# Patient Record
Sex: FEMALE | Race: WHITE | NOT HISPANIC OR LATINO
[De-identification: names, ages, dates, MRNs, and addresses within clinical notes are randomized per-mention and may not be internally consistent; named-entity substitution may affect disease eponyms.]

---

## 2017-01-09 ENCOUNTER — APPOINTMENT (OUTPATIENT)
Dept: CARDIOLOGY | Facility: CLINIC | Age: 82
End: 2017-01-09

## 2017-01-16 ENCOUNTER — APPOINTMENT (OUTPATIENT)
Dept: OTOLARYNGOLOGY | Facility: CLINIC | Age: 82
End: 2017-01-16

## 2017-01-16 VITALS
BODY MASS INDEX: 29.27 KG/M2 | SYSTOLIC BLOOD PRESSURE: 143 MMHG | HEART RATE: 74 BPM | HEIGHT: 61 IN | DIASTOLIC BLOOD PRESSURE: 79 MMHG | WEIGHT: 155 LBS

## 2017-01-16 DIAGNOSIS — H61.23 IMPACTED CERUMEN, BILATERAL: ICD-10-CM

## 2017-01-16 DIAGNOSIS — R42 DIZZINESS AND GIDDINESS: ICD-10-CM

## 2017-01-16 DIAGNOSIS — Z88.9 ALLERGY STATUS TO UNSPECIFIED DRUGS, MEDICAMENTS AND BIOLOGICAL SUBSTANCES: ICD-10-CM

## 2017-01-16 DIAGNOSIS — H90.3 SENSORINEURAL HEARING LOSS, BILATERAL: ICD-10-CM

## 2017-01-23 ENCOUNTER — APPOINTMENT (OUTPATIENT)
Dept: CARDIOLOGY | Facility: CLINIC | Age: 82
End: 2017-01-23

## 2017-01-23 ENCOUNTER — NON-APPOINTMENT (OUTPATIENT)
Age: 82
End: 2017-01-23

## 2017-01-23 VITALS
SYSTOLIC BLOOD PRESSURE: 84 MMHG | BODY MASS INDEX: 30.21 KG/M2 | DIASTOLIC BLOOD PRESSURE: 58 MMHG | WEIGHT: 160 LBS | HEART RATE: 80 BPM | RESPIRATION RATE: 14 BRPM | HEIGHT: 61 IN

## 2017-01-23 DIAGNOSIS — I49.3 VENTRICULAR PREMATURE DEPOLARIZATION: ICD-10-CM

## 2017-01-23 DIAGNOSIS — R00.2 PALPITATIONS: ICD-10-CM

## 2017-01-23 RX ORDER — AMOXICILLIN 500 MG/1
500 CAPSULE ORAL
Qty: 21 | Refills: 0 | Status: DISCONTINUED | COMMUNITY
Start: 2016-08-22

## 2017-01-23 RX ORDER — ZOLPIDEM TARTRATE 5 MG/1
5 TABLET ORAL
Qty: 30 | Refills: 0 | Status: ACTIVE | COMMUNITY
Start: 2016-12-21

## 2017-02-08 ENCOUNTER — APPOINTMENT (OUTPATIENT)
Dept: OTOLARYNGOLOGY | Facility: CLINIC | Age: 82
End: 2017-02-08

## 2017-02-10 ENCOUNTER — RESULT REVIEW (OUTPATIENT)
Age: 82
End: 2017-02-10

## 2017-08-31 ENCOUNTER — APPOINTMENT (OUTPATIENT)
Dept: UROGYNECOLOGY | Facility: CLINIC | Age: 82
End: 2017-08-31
Payer: MEDICARE

## 2017-08-31 VITALS
HEIGHT: 61 IN | WEIGHT: 155 LBS | BODY MASS INDEX: 29.27 KG/M2 | DIASTOLIC BLOOD PRESSURE: 72 MMHG | HEART RATE: 80 BPM | SYSTOLIC BLOOD PRESSURE: 110 MMHG

## 2017-08-31 PROCEDURE — 51725 SIMPLE CYSTOMETROGRAM: CPT

## 2017-08-31 PROCEDURE — 51741 ELECTRO-UROFLOWMETRY FIRST: CPT

## 2017-08-31 PROCEDURE — 99204 OFFICE O/P NEW MOD 45 MIN: CPT | Mod: 25

## 2017-08-31 PROCEDURE — 81003 URINALYSIS AUTO W/O SCOPE: CPT | Mod: QW

## 2017-08-31 RX ORDER — HYDROCODONE BITARTRATE AND HOMATROPINE METHYLBROMIDE 5; 1.5 MG/5ML; MG/5ML
5-1.5 SYRUP ORAL
Qty: 100 | Refills: 0 | Status: DISCONTINUED | COMMUNITY
Start: 2017-01-13 | End: 2017-08-31

## 2017-09-01 ENCOUNTER — APPOINTMENT (OUTPATIENT)
Dept: CARDIOLOGY | Facility: CLINIC | Age: 82
End: 2017-09-01
Payer: MEDICARE

## 2017-09-01 ENCOUNTER — RESULT REVIEW (OUTPATIENT)
Age: 82
End: 2017-09-01

## 2017-09-01 ENCOUNTER — NON-APPOINTMENT (OUTPATIENT)
Age: 82
End: 2017-09-01

## 2017-09-01 VITALS
WEIGHT: 161 LBS | DIASTOLIC BLOOD PRESSURE: 58 MMHG | SYSTOLIC BLOOD PRESSURE: 116 MMHG | HEART RATE: 86 BPM | BODY MASS INDEX: 30.4 KG/M2 | OXYGEN SATURATION: 96 % | HEIGHT: 61 IN

## 2017-09-01 DIAGNOSIS — R42 DIZZINESS AND GIDDINESS: ICD-10-CM

## 2017-09-01 PROCEDURE — 99214 OFFICE O/P EST MOD 30 MIN: CPT

## 2017-09-01 PROCEDURE — 93000 ELECTROCARDIOGRAM COMPLETE: CPT

## 2017-09-05 ENCOUNTER — RESULT REVIEW (OUTPATIENT)
Age: 82
End: 2017-09-05

## 2017-09-05 LAB
APPEARANCE: CLEAR
BACTERIA UR CULT: ABNORMAL
BACTERIA: ABNORMAL
BILIRUB UR QL STRIP: NORMAL
BILIRUBIN URINE: NEGATIVE
BLOOD URINE: NEGATIVE
CLARITY UR: CLEAR
COLLECTION METHOD: NORMAL
COLOR: YELLOW
GLUCOSE QUALITATIVE U: NORMAL MG/DL
GLUCOSE UR-MCNC: NORMAL
HCG UR QL: 0.2 EU/DL
HGB UR QL STRIP.AUTO: NORMAL
KETONES UR-MCNC: NORMAL
KETONES URINE: NEGATIVE
LEUKOCYTE ESTERASE UR QL STRIP: NORMAL
LEUKOCYTE ESTERASE URINE: ABNORMAL
MICROSCOPIC-UA: NORMAL
NITRITE UR QL STRIP: POSITIVE
NITRITE URINE: POSITIVE
PH UR STRIP: 5.5
PH URINE: 6.5
PROT UR STRIP-MCNC: NORMAL
PROTEIN URINE: NEGATIVE MG/DL
RED BLOOD CELLS URINE: 2 /HPF
SP GR UR STRIP: 1.01
SPECIFIC GRAVITY URINE: 1.02
SQUAMOUS EPITHELIAL CELLS: 4 /HPF
UROBILINOGEN URINE: NORMAL MG/DL
WHITE BLOOD CELLS URINE: 5 /HPF

## 2017-09-05 RX ORDER — SULFAMETHOXAZOLE AND TRIMETHOPRIM 800; 160 MG/1; MG/1
800-160 TABLET ORAL
Qty: 6 | Refills: 0 | Status: COMPLETED | COMMUNITY
Start: 2017-08-31 | End: 2017-09-05

## 2017-09-12 ENCOUNTER — MESSAGE (OUTPATIENT)
Age: 82
End: 2017-09-12

## 2017-09-28 ENCOUNTER — MESSAGE (OUTPATIENT)
Age: 82
End: 2017-09-28

## 2017-10-12 ENCOUNTER — RESULT REVIEW (OUTPATIENT)
Age: 82
End: 2017-10-12

## 2017-10-12 ENCOUNTER — APPOINTMENT (OUTPATIENT)
Dept: UROGYNECOLOGY | Facility: CLINIC | Age: 82
End: 2017-10-12
Payer: MEDICARE

## 2017-10-12 LAB
BILIRUB UR QL STRIP: NEGATIVE
CLARITY UR: CLEAR
COLLECTION METHOD: NORMAL
GLUCOSE UR-MCNC: NEGATIVE
HCG UR QL: 0.2 EU/DL
HGB UR QL STRIP.AUTO: NEGATIVE
KETONES UR-MCNC: NEGATIVE
LEUKOCYTE ESTERASE UR QL STRIP: NEGATIVE
NITRITE UR QL STRIP: NEGATIVE
PH UR STRIP: 5
PROT UR STRIP-MCNC: NEGATIVE
SP GR UR STRIP: 1.01

## 2017-10-12 PROCEDURE — 99213 OFFICE O/P EST LOW 20 MIN: CPT | Mod: 25

## 2017-10-12 PROCEDURE — 81003 URINALYSIS AUTO W/O SCOPE: CPT | Mod: QW

## 2017-10-12 RX ORDER — OXYBUTYNIN CHLORIDE 100 MG/G
10 GEL TRANSDERMAL
Qty: 1 | Refills: 0 | Status: DISCONTINUED | COMMUNITY
Start: 2017-08-31 | End: 2017-10-12

## 2017-10-17 DIAGNOSIS — R35.0 FREQUENCY OF MICTURITION: ICD-10-CM

## 2017-10-27 ENCOUNTER — APPOINTMENT (OUTPATIENT)
Dept: UROGYNECOLOGY | Facility: CLINIC | Age: 82
End: 2017-10-27
Payer: MEDICARE

## 2017-10-27 DIAGNOSIS — N32.81 OVERACTIVE BLADDER: ICD-10-CM

## 2017-10-27 PROCEDURE — 99214 OFFICE O/P EST MOD 30 MIN: CPT

## 2017-11-28 PROBLEM — N32.81 OAB (OVERACTIVE BLADDER): Status: ACTIVE | Noted: 2017-08-31

## 2018-05-01 ENCOUNTER — NON-APPOINTMENT (OUTPATIENT)
Age: 83
End: 2018-05-01

## 2018-05-01 ENCOUNTER — APPOINTMENT (OUTPATIENT)
Dept: CARDIOLOGY | Facility: CLINIC | Age: 83
End: 2018-05-01
Payer: MEDICARE

## 2018-05-01 VITALS
HEART RATE: 82 BPM | SYSTOLIC BLOOD PRESSURE: 102 MMHG | HEIGHT: 61 IN | WEIGHT: 165 LBS | DIASTOLIC BLOOD PRESSURE: 62 MMHG | BODY MASS INDEX: 31.15 KG/M2

## 2018-05-01 PROCEDURE — 99214 OFFICE O/P EST MOD 30 MIN: CPT

## 2018-05-01 PROCEDURE — 93000 ELECTROCARDIOGRAM COMPLETE: CPT

## 2019-04-09 ENCOUNTER — APPOINTMENT (OUTPATIENT)
Dept: RADIOLOGY | Facility: IMAGING CENTER | Age: 84
End: 2019-04-09
Payer: MEDICARE

## 2019-04-09 ENCOUNTER — OUTPATIENT (OUTPATIENT)
Dept: OUTPATIENT SERVICES | Facility: HOSPITAL | Age: 84
LOS: 1 days | End: 2019-04-09
Payer: MEDICARE

## 2019-04-09 DIAGNOSIS — Z00.8 ENCOUNTER FOR OTHER GENERAL EXAMINATION: ICD-10-CM

## 2019-04-09 PROCEDURE — 77080 DXA BONE DENSITY AXIAL: CPT | Mod: 26

## 2019-04-09 PROCEDURE — 77080 DXA BONE DENSITY AXIAL: CPT

## 2019-07-03 ENCOUNTER — APPOINTMENT (OUTPATIENT)
Dept: CARDIOLOGY | Facility: CLINIC | Age: 84
End: 2019-07-03
Payer: MEDICARE

## 2019-07-03 ENCOUNTER — NON-APPOINTMENT (OUTPATIENT)
Age: 84
End: 2019-07-03

## 2019-07-03 VITALS
OXYGEN SATURATION: 97 % | SYSTOLIC BLOOD PRESSURE: 116 MMHG | RESPIRATION RATE: 14 BRPM | BODY MASS INDEX: 30.42 KG/M2 | WEIGHT: 161 LBS | HEART RATE: 78 BPM | DIASTOLIC BLOOD PRESSURE: 62 MMHG

## 2019-07-03 DIAGNOSIS — I34.0 NONRHEUMATIC MITRAL (VALVE) INSUFFICIENCY: ICD-10-CM

## 2019-07-03 DIAGNOSIS — R55 SYNCOPE AND COLLAPSE: ICD-10-CM

## 2019-07-03 DIAGNOSIS — I35.0 NONRHEUMATIC AORTIC (VALVE) STENOSIS: ICD-10-CM

## 2019-07-03 PROCEDURE — 99214 OFFICE O/P EST MOD 30 MIN: CPT

## 2019-07-03 PROCEDURE — 93000 ELECTROCARDIOGRAM COMPLETE: CPT

## 2019-07-03 RX ORDER — OXYBUTYNIN CHLORIDE 10 MG/1
10 TABLET, EXTENDED RELEASE ORAL
Qty: 30 | Refills: 3 | Status: DISCONTINUED | COMMUNITY
Start: 2017-09-12 | End: 2019-07-03

## 2019-07-03 RX ORDER — WHEAT DEXTRIN 3 G/4 G
POWDER (GRAM) ORAL
Refills: 0 | Status: ACTIVE | COMMUNITY

## 2019-07-03 RX ORDER — MELOXICAM 7.5 MG/1
7.5 TABLET ORAL
Refills: 0 | Status: ACTIVE | COMMUNITY

## 2019-07-03 RX ORDER — CALCIUM CITRATE/VITAMIN D3 315MG-6.25
TABLET ORAL
Refills: 0 | Status: ACTIVE | COMMUNITY

## 2019-07-04 NOTE — DISCUSSION/SUMMARY
[FreeTextEntry1] : \par I reviewed prior testing. Holter recording done in 2016 after her syncopal event showed chronotropic competence; I do not think her worsening exertional dyspnea is related to her underlying heart rhythm. Echocardiography in 2016 showed some valvular abnormality; I asked her to return at her convenience for a followup study, to assess the progression of valve disease could explain her worsening shortness of breath. Cardiac stress testing was done years ago, and those records are no longer available; by her recollection and mine, no significant abnormality was seen at that time, but it echocardiography does not provide an explanation, reassessment of her coronary arteries would be appropriate, either by pharmacologic stress isotope study or possibly cardiac CT angiography.\par \par I will request a copy of most recent blood work from Dr. Reynoso.\par \par She will continue her present medications.\par \par She will return within the next few weeks for a followup visit with echo.

## 2019-07-04 NOTE — ASSESSMENT
[FreeTextEntry1] : Exertional dyspnea\par \par Syncopal event in 2016, cause unclear; no recurrence.  \par \par History of atypical chest discomfort in the past, no recent recurrence.\par \par Mild mitral and tricuspid valve insufficiency; mild aortic valve stenosis.\par \par Carotid artery disease

## 2019-07-04 NOTE — HISTORY OF PRESENT ILLNESS
[FreeTextEntry1] : I last saw her in May of 2018.  As she returns, she describes worsening LUTZ over the past several months, moreso than previously, which is limiting her activities.  She acknowledges that she has been more sedentary in general, and wonders if this could be a contributing factor, as she is no longer able to exercise. She does not describe exertional chest discomfort and she reports no palpitations. There have been no episodes of lightheadedness and no recurrence of syncope.\par \par She tells blood work has not shown any significant abnormality. Medications are unchanged.

## 2019-07-04 NOTE — PHYSICAL EXAM
[General Appearance - Well Developed] : well developed [Normal Appearance] : normal appearance [Well Groomed] : well groomed [General Appearance - Well Nourished] : well nourished [General Appearance - In No Acute Distress] : no acute distress [Normal Conjunctiva] : the conjunctiva exhibited no abnormalities [Eyelids - No Xanthelasma] : the eyelids demonstrated no xanthelasmas [Normal Jugular Venous A Waves Present] : normal jugular venous A waves present [Normal Jugular Venous V Waves Present] : normal jugular venous V waves present [Respiration, Rhythm And Depth] : normal respiratory rhythm and effort [Auscultation Breath Sounds / Voice Sounds] : lungs were clear to auscultation bilaterally [Heart Rate And Rhythm] : heart rate and rhythm were normal [Bowel Sounds] : normal bowel sounds [Abnormal Walk] : normal gait [Nail Clubbing] : no clubbing of the fingernails [Cyanosis, Localized] : no localized cyanosis [Skin Color & Pigmentation] : normal skin color and pigmentation [] : no rash [No Venous Stasis] : no venous stasis [Oriented To Time, Place, And Person] : oriented to person, place, and time [Impaired Insight] : insight and judgment were intact [Affect] : the affect was normal [Mood] : the mood was normal [FreeTextEntry1] : Normal bowel sounds. Soft, non-tender. Liver and spleen not palpable; aortic pulsation is normal.

## 2019-07-30 ENCOUNTER — APPOINTMENT (OUTPATIENT)
Dept: CARDIOLOGY | Facility: CLINIC | Age: 84
End: 2019-07-30
Payer: MEDICARE

## 2019-07-30 VITALS
HEART RATE: 75 BPM | DIASTOLIC BLOOD PRESSURE: 65 MMHG | OXYGEN SATURATION: 96 % | WEIGHT: 161 LBS | HEIGHT: 61 IN | BODY MASS INDEX: 30.4 KG/M2 | SYSTOLIC BLOOD PRESSURE: 107 MMHG

## 2019-07-30 DIAGNOSIS — R06.00 DYSPNEA, UNSPECIFIED: ICD-10-CM

## 2019-07-30 PROCEDURE — 93306 TTE W/DOPPLER COMPLETE: CPT

## 2019-07-30 PROCEDURE — 99213 OFFICE O/P EST LOW 20 MIN: CPT

## 2019-07-30 NOTE — HISTORY OF PRESENT ILLNESS
[FreeTextEntry1] : I last saw her earlier this month.  Since that time, the degree of LUTZ is unchanged; at most, she can walk about one city block before stopping to rest. She does not describe exertional chest discomfort and she reports no palpitations. There have been no episodes of lightheadedness and no recurrence of syncope.

## 2019-07-30 NOTE — DISCUSSION/SUMMARY
[FreeTextEntry1] : \par LUTZ - no compelling evidence of a cardiac cause.  \par Echo findings today are unchanged compared to prior study.  LV systolic function is normal and only mild diastolic dysfunction is seen.  No notable valvular findings present; atrial chambers are of normal size and no evidence of pulm. HTN or RV dysfunction noted.\par Holter recording done in 2016 after her syncopal event showed chronotropic competence.\par Cardiac stress testing was done years ago (records are no longer available); by her recollection and mine, no significant abnormality was seen at that time.  Given her lack of risk factors, ischemic heart disease seems quite unlikely.  She is not interested in re-assessment of the coronary arteries; it think this is reasonable.\par \par She will continue her present medications.\par \par She will return on a prn basis.

## 2019-07-30 NOTE — PHYSICAL EXAM
[Normal Appearance] : normal appearance [General Appearance - Well Developed] : well developed [Well Groomed] : well groomed [General Appearance - Well Nourished] : well nourished [General Appearance - In No Acute Distress] : no acute distress [Normal Conjunctiva] : the conjunctiva exhibited no abnormalities [Eyelids - No Xanthelasma] : the eyelids demonstrated no xanthelasmas [Normal Jugular Venous A Waves Present] : normal jugular venous A waves present [Normal Jugular Venous V Waves Present] : normal jugular venous V waves present [Respiration, Rhythm And Depth] : normal respiratory rhythm and effort [Auscultation Breath Sounds / Voice Sounds] : lungs were clear to auscultation bilaterally [Heart Rate And Rhythm] : heart rate and rhythm were normal [Bowel Sounds] : normal bowel sounds [Nail Clubbing] : no clubbing of the fingernails [Abnormal Walk] : normal gait [Cyanosis, Localized] : no localized cyanosis [Skin Color & Pigmentation] : normal skin color and pigmentation [] : no rash [No Venous Stasis] : no venous stasis [Oriented To Time, Place, And Person] : oriented to person, place, and time [Impaired Insight] : insight and judgment were intact [Mood] : the mood was normal [Affect] : the affect was normal [FreeTextEntry1] : 2+ pulses in the upper extremities, 1+ pulses in the feet. No edema.

## 2023-09-27 ENCOUNTER — OFFICE (OUTPATIENT)
Dept: URBAN - METROPOLITAN AREA CLINIC 112 | Facility: CLINIC | Age: 88
Setting detail: OPHTHALMOLOGY
End: 2023-09-27
Payer: MEDICARE

## 2023-09-27 DIAGNOSIS — H02.132: ICD-10-CM

## 2023-09-27 DIAGNOSIS — H02.135: ICD-10-CM

## 2023-09-27 DIAGNOSIS — H16.221: ICD-10-CM

## 2023-09-27 DIAGNOSIS — H16.222: ICD-10-CM

## 2023-09-27 DIAGNOSIS — H35.342: ICD-10-CM

## 2023-09-27 PROCEDURE — 83861 MICROFLUID ANALY TEARS: CPT | Performed by: OPHTHALMOLOGY

## 2023-09-27 PROCEDURE — 92134 CPTRZ OPH DX IMG PST SGM RTA: CPT | Performed by: OPHTHALMOLOGY

## 2023-09-27 PROCEDURE — 92014 COMPRE OPH EXAM EST PT 1/>: CPT | Performed by: OPHTHALMOLOGY

## 2023-09-27 ASSESSMENT — REFRACTION_CURRENTRX
OD_SPHERE: +3.25
OD_ADD: +0.25
OS_SPHERE: +3.50
OD_SPHERE: +3.00
OS_OVR_VA: 20/
OD_CYLINDER: 0.00
OS_AXIS: 015
OD_AXIS: 000
OD_OVR_VA: 20/
OD_OVR_VA: 20/
OS_OVR_VA: 20/
OS_CYLINDER: -0.25
OS_ADD: +0.25
OS_SPHERE: +2.75

## 2023-09-27 ASSESSMENT — VISUAL ACUITY
OS_BCVA: 20/50
OD_BCVA: 20/40

## 2023-09-27 ASSESSMENT — CONFRONTATIONAL VISUAL FIELD TEST (CVF)
OS_FINDINGS: FULL
OD_FINDINGS: FULL

## 2023-09-27 ASSESSMENT — AXIALLENGTH_DERIVED
OS_AL: 24.2321
OD_AL: 24.6472

## 2023-09-27 ASSESSMENT — SPHEQUIV_DERIVED
OS_SPHEQUIV: 0.875
OD_SPHEQUIV: 0

## 2023-09-27 ASSESSMENT — TONOMETRY
OD_IOP_MMHG: 14
OS_IOP_MMHG: 15

## 2023-09-27 ASSESSMENT — KERATOMETRY
OS_AXISANGLE_DEGREES: 161
OS_K2POWER_DIOPTERS: 41.00
OS_K1POWER_DIOPTERS: 40.75
OD_K2POWER_DIOPTERS: 41.25
OD_K1POWER_DIOPTERS: 40.25
OD_AXISANGLE_DEGREES: 067

## 2023-09-27 ASSESSMENT — REFRACTION_AUTOREFRACTION
OS_SPHERE: +1.50
OD_CYLINDER: -1.50
OD_AXIS: 149
OS_CYLINDER: -1.25
OD_SPHERE: +0.75
OS_AXIS: 087

## 2023-09-27 ASSESSMENT — LID POSITION - ECTROPION
OD_ECTROPION: T
OS_ECTROPION: 2+

## 2023-11-07 ENCOUNTER — OFFICE (OUTPATIENT)
Dept: URBAN - METROPOLITAN AREA CLINIC 88 | Facility: CLINIC | Age: 88
Setting detail: OPHTHALMOLOGY
End: 2023-11-07
Payer: MEDICARE

## 2023-11-07 DIAGNOSIS — H35.3132: ICD-10-CM

## 2023-11-07 DIAGNOSIS — H35.342: ICD-10-CM

## 2023-11-07 DIAGNOSIS — Q14.8: ICD-10-CM

## 2023-11-07 DIAGNOSIS — H35.372: ICD-10-CM

## 2023-11-07 DIAGNOSIS — H35.033: ICD-10-CM

## 2023-11-07 PROCEDURE — 99214 OFFICE O/P EST MOD 30 MIN: CPT | Mod: 25 | Performed by: OPHTHALMOLOGY

## 2023-11-07 PROCEDURE — 92134 CPTRZ OPH DX IMG PST SGM RTA: CPT | Performed by: OPHTHALMOLOGY

## 2023-11-07 PROCEDURE — 92235 FLUORESCEIN ANGRPH MLTIFRAME: CPT | Performed by: OPHTHALMOLOGY

## 2023-11-07 PROCEDURE — 67028 INJECTION EYE DRUG: CPT | Mod: RT | Performed by: OPHTHALMOLOGY

## 2023-11-07 ASSESSMENT — LID POSITION - ECTROPION
OD_ECTROPION: T
OS_ECTROPION: 2+

## 2023-11-07 ASSESSMENT — CONFRONTATIONAL VISUAL FIELD TEST (CVF)
OD_FINDINGS: FULL
OS_FINDINGS: FULL

## 2023-11-08 PROBLEM — Q14.8 RETINAL MACRO ANEURYSM ; RIGHT EYE: Status: ACTIVE | Noted: 2023-11-07

## 2023-12-05 ENCOUNTER — OFFICE (OUTPATIENT)
Dept: URBAN - METROPOLITAN AREA CLINIC 88 | Facility: CLINIC | Age: 88
Setting detail: OPHTHALMOLOGY
End: 2023-12-05
Payer: MEDICARE

## 2023-12-05 DIAGNOSIS — H35.033: ICD-10-CM

## 2023-12-05 DIAGNOSIS — Q14.8: ICD-10-CM

## 2023-12-05 DIAGNOSIS — H35.342: ICD-10-CM

## 2023-12-05 DIAGNOSIS — H35.3132: ICD-10-CM

## 2023-12-05 DIAGNOSIS — H35.372: ICD-10-CM

## 2023-12-05 PROCEDURE — 92134 CPTRZ OPH DX IMG PST SGM RTA: CPT | Performed by: OPHTHALMOLOGY

## 2023-12-05 PROCEDURE — 67028 INJECTION EYE DRUG: CPT | Mod: RT | Performed by: OPHTHALMOLOGY

## 2023-12-05 ASSESSMENT — LID POSITION - ECTROPION
OD_ECTROPION: T
OS_ECTROPION: 2+

## 2023-12-05 ASSESSMENT — CONFRONTATIONAL VISUAL FIELD TEST (CVF)
OS_FINDINGS: FULL
OD_FINDINGS: FULL

## 2024-01-04 ASSESSMENT — KERATOMETRY
OD_K2POWER_DIOPTERS: 41.25
OD_K1POWER_DIOPTERS: 40.25
OD_AXISANGLE_DEGREES: 067
OS_K1POWER_DIOPTERS: 40.75
OS_K2POWER_DIOPTERS: 41.00
OS_AXISANGLE_DEGREES: 161

## 2024-01-04 ASSESSMENT — REFRACTION_AUTOREFRACTION
OS_AXIS: 087
OS_CYLINDER: -1.25
OD_AXIS: 149
OS_SPHERE: +1.50
OD_CYLINDER: -1.50
OD_SPHERE: +0.75

## 2024-01-04 ASSESSMENT — AXIALLENGTH_DERIVED
OS_AL: 24.2321
OD_AL: 24.6472

## 2024-01-04 ASSESSMENT — SPHEQUIV_DERIVED
OS_SPHEQUIV: 0.875
OD_SPHEQUIV: 0

## 2024-01-16 ENCOUNTER — OFFICE (OUTPATIENT)
Dept: URBAN - METROPOLITAN AREA CLINIC 88 | Facility: CLINIC | Age: 89
Setting detail: OPHTHALMOLOGY
End: 2024-01-16
Payer: MEDICARE

## 2024-01-16 DIAGNOSIS — H35.81: ICD-10-CM

## 2024-01-16 DIAGNOSIS — H35.372: ICD-10-CM

## 2024-01-16 PROCEDURE — 92134 CPTRZ OPH DX IMG PST SGM RTA: CPT | Performed by: OPHTHALMOLOGY

## 2024-01-16 PROCEDURE — 67028 INJECTION EYE DRUG: CPT | Mod: RT | Performed by: OPHTHALMOLOGY

## 2024-01-16 ASSESSMENT — CONFRONTATIONAL VISUAL FIELD TEST (CVF)
OS_FINDINGS: FULL
OD_FINDINGS: FULL

## 2024-01-16 ASSESSMENT — LID POSITION - ECTROPION
OS_ECTROPION: 2+
OD_ECTROPION: T

## 2024-01-17 ASSESSMENT — REFRACTION_AUTOREFRACTION
OD_CYLINDER: -1.50
OD_AXIS: 149
OS_AXIS: 087
OS_CYLINDER: -1.25
OD_SPHERE: +0.75
OS_SPHERE: +1.50

## 2024-01-17 ASSESSMENT — SPHEQUIV_DERIVED
OD_SPHEQUIV: 0
OS_SPHEQUIV: 0.875

## 2024-02-20 ASSESSMENT — REFRACTION_AUTOREFRACTION
OS_CYLINDER: -1.25
OS_AXIS: 087
OD_CYLINDER: -1.50
OS_SPHERE: +1.50
OD_AXIS: 149
OD_SPHERE: +0.75

## 2024-02-20 ASSESSMENT — AXIALLENGTH_DERIVED
OS_AL: 24.2321
OD_AL: 24.6472

## 2024-02-20 ASSESSMENT — SPHEQUIV_DERIVED
OD_SPHEQUIV: 0
OS_SPHEQUIV: 0.875

## 2024-03-19 ENCOUNTER — OFFICE (OUTPATIENT)
Dept: URBAN - METROPOLITAN AREA CLINIC 12 | Facility: CLINIC | Age: 89
Setting detail: OPHTHALMOLOGY
End: 2024-03-19
Payer: MEDICARE

## 2024-03-19 DIAGNOSIS — H35.372: ICD-10-CM

## 2024-03-19 DIAGNOSIS — H35.81: ICD-10-CM

## 2024-03-19 DIAGNOSIS — Q14.8: ICD-10-CM

## 2024-03-19 DIAGNOSIS — H35.3132: ICD-10-CM

## 2024-03-19 DIAGNOSIS — H35.342: ICD-10-CM

## 2024-03-19 DIAGNOSIS — H90.3: ICD-10-CM

## 2024-03-19 DIAGNOSIS — H35.033: ICD-10-CM

## 2024-03-19 PROCEDURE — 92567 TYMPANOMETRY: CPT | Mod: AB

## 2024-03-19 PROCEDURE — 92134 CPTRZ OPH DX IMG PST SGM RTA: CPT | Performed by: OPHTHALMOLOGY

## 2024-03-19 PROCEDURE — 92012 INTRM OPH EXAM EST PATIENT: CPT | Performed by: OPHTHALMOLOGY

## 2024-03-19 PROCEDURE — 92557 COMPREHENSIVE HEARING TEST: CPT | Mod: AB

## 2024-03-19 ASSESSMENT — LID POSITION - ECTROPION
OS_ECTROPION: 2+
OD_ECTROPION: T

## 2024-06-04 ENCOUNTER — OFFICE (OUTPATIENT)
Dept: URBAN - METROPOLITAN AREA CLINIC 88 | Facility: CLINIC | Age: 89
Setting detail: OPHTHALMOLOGY
End: 2024-06-04
Payer: MEDICARE

## 2024-06-04 DIAGNOSIS — H35.81: ICD-10-CM

## 2024-06-04 DIAGNOSIS — H35.033: ICD-10-CM

## 2024-06-04 DIAGNOSIS — H35.3132: ICD-10-CM

## 2024-06-04 DIAGNOSIS — H35.372: ICD-10-CM

## 2024-06-04 DIAGNOSIS — H35.342: ICD-10-CM

## 2024-06-04 DIAGNOSIS — Q14.8: ICD-10-CM

## 2024-06-04 PROCEDURE — 92014 COMPRE OPH EXAM EST PT 1/>: CPT | Performed by: OPHTHALMOLOGY

## 2024-06-04 PROCEDURE — 92134 CPTRZ OPH DX IMG PST SGM RTA: CPT | Performed by: OPHTHALMOLOGY

## 2024-06-04 PROCEDURE — 92250 FUNDUS PHOTOGRAPHY W/I&R: CPT | Performed by: OPHTHALMOLOGY

## 2024-06-04 ASSESSMENT — CONFRONTATIONAL VISUAL FIELD TEST (CVF)
OS_FINDINGS: FULL
OD_FINDINGS: FULL

## 2024-06-04 ASSESSMENT — LID POSITION - ECTROPION
OD_ECTROPION: T
OS_ECTROPION: 2+

## 2025-01-08 ENCOUNTER — OFFICE (OUTPATIENT)
Dept: URBAN - METROPOLITAN AREA CLINIC 88 | Facility: CLINIC | Age: OVER 89
Setting detail: OPHTHALMOLOGY
End: 2025-01-08
Payer: MEDICARE

## 2025-01-08 DIAGNOSIS — H35.81: ICD-10-CM

## 2025-01-08 DIAGNOSIS — H35.033: ICD-10-CM

## 2025-01-08 DIAGNOSIS — H35.3132: ICD-10-CM

## 2025-01-08 DIAGNOSIS — Q14.8: ICD-10-CM

## 2025-01-08 DIAGNOSIS — H35.342: ICD-10-CM

## 2025-01-08 DIAGNOSIS — H35.372: ICD-10-CM

## 2025-01-08 PROCEDURE — 92134 CPTRZ OPH DX IMG PST SGM RTA: CPT | Performed by: OPHTHALMOLOGY

## 2025-01-08 PROCEDURE — 92014 COMPRE OPH EXAM EST PT 1/>: CPT | Performed by: OPHTHALMOLOGY

## 2025-01-08 ASSESSMENT — REFRACTION_AUTOREFRACTION
OD_CYLINDER: -1.50
OS_CYLINDER: -1.25
OD_AXIS: 149
OS_AXIS: 087
OS_SPHERE: +1.50
OD_SPHERE: +0.75

## 2025-01-08 ASSESSMENT — KERATOMETRY
OD_K2POWER_DIOPTERS: 41.25
OD_K1POWER_DIOPTERS: 40.25
OS_AXISANGLE_DEGREES: 161
OD_AXISANGLE_DEGREES: 067
OS_K2POWER_DIOPTERS: 41.00
OS_K1POWER_DIOPTERS: 40.75

## 2025-01-08 ASSESSMENT — LID POSITION - ECTROPION
OD_ECTROPION: T
OS_ECTROPION: 2+

## 2025-01-08 ASSESSMENT — CONFRONTATIONAL VISUAL FIELD TEST (CVF)
OS_FINDINGS: FULL
OD_FINDINGS: FULL

## 2025-01-08 ASSESSMENT — TONOMETRY
OS_IOP_MMHG: 14
OD_IOP_MMHG: 16

## 2025-01-08 ASSESSMENT — VISUAL ACUITY
OD_BCVA: 20/30-1
OS_BCVA: 20/30-1

## 2025-07-09 ENCOUNTER — OFFICE (OUTPATIENT)
Dept: URBAN - METROPOLITAN AREA CLINIC 112 | Facility: CLINIC | Age: OVER 89
Setting detail: OPHTHALMOLOGY
End: 2025-07-09
Payer: MEDICARE

## 2025-07-09 DIAGNOSIS — Q14.8: ICD-10-CM

## 2025-07-09 DIAGNOSIS — H35.342: ICD-10-CM

## 2025-07-09 DIAGNOSIS — H35.81: ICD-10-CM

## 2025-07-09 DIAGNOSIS — H35.033: ICD-10-CM

## 2025-07-09 DIAGNOSIS — H35.3132: ICD-10-CM

## 2025-07-09 DIAGNOSIS — H35.372: ICD-10-CM

## 2025-07-09 DIAGNOSIS — H16.221: ICD-10-CM

## 2025-07-09 DIAGNOSIS — H16.222: ICD-10-CM

## 2025-07-09 PROCEDURE — 92134 CPTRZ OPH DX IMG PST SGM RTA: CPT | Performed by: OPHTHALMOLOGY

## 2025-07-09 PROCEDURE — 99213 OFFICE O/P EST LOW 20 MIN: CPT | Performed by: OPHTHALMOLOGY

## 2025-07-09 ASSESSMENT — KERATOMETRY
OS_K2POWER_DIOPTERS: 41.00
OS_AXISANGLE_DEGREES: 090
OD_K2POWER_DIOPTERS: 41.25
OS_K1POWER_DIOPTERS: 41.00
OD_K1POWER_DIOPTERS: 40.75
OD_AXISANGLE_DEGREES: 044

## 2025-07-09 ASSESSMENT — REFRACTION_MANIFEST
OD_CYLINDER: -0.50
OS_SPHERE: +0.50
OS_ADD: +3.00
OD_SPHERE: +0.50
OS_AXIS: 95
OD_AXIS: 130
OS_CYLINDER: -0.50
OD_ADD: +3.00

## 2025-07-09 ASSESSMENT — REFRACTION_AUTOREFRACTION
OD_SPHERE: +1.00
OS_SPHERE: +1.25
OD_AXIS: 128
OD_CYLINDER: -1.25
OS_CYLINDER: -1.00
OS_AXIS: 096

## 2025-07-09 ASSESSMENT — LID POSITION - ECTROPION
OS_ECTROPION: 2+
OD_ECTROPION: T

## 2025-07-09 ASSESSMENT — TONOMETRY
OS_IOP_MMHG: 15
OD_IOP_MMHG: 16

## 2025-07-09 ASSESSMENT — CONFRONTATIONAL VISUAL FIELD TEST (CVF)
OD_FINDINGS: FULL
OS_FINDINGS: FULL

## 2025-07-09 ASSESSMENT — VISUAL ACUITY
OS_BCVA: 20/40
OD_BCVA: 20/30-1